# Patient Record
Sex: MALE | Race: OTHER | NOT HISPANIC OR LATINO
[De-identification: names, ages, dates, MRNs, and addresses within clinical notes are randomized per-mention and may not be internally consistent; named-entity substitution may affect disease eponyms.]

---

## 2017-03-16 PROBLEM — Z00.00 ENCOUNTER FOR PREVENTIVE HEALTH EXAMINATION: Status: ACTIVE | Noted: 2017-03-16

## 2017-12-01 ENCOUNTER — APPOINTMENT (OUTPATIENT)
Dept: PULMONOLOGY | Facility: CLINIC | Age: 67
End: 2017-12-01
Payer: MEDICARE

## 2017-12-01 VITALS
TEMPERATURE: 98.2 F | DIASTOLIC BLOOD PRESSURE: 73 MMHG | SYSTOLIC BLOOD PRESSURE: 130 MMHG | HEART RATE: 83 BPM | HEIGHT: 66 IN | OXYGEN SATURATION: 97 % | BODY MASS INDEX: 28.79 KG/M2 | WEIGHT: 179.13 LBS

## 2017-12-01 DIAGNOSIS — Z83.3 FAMILY HISTORY OF DIABETES MELLITUS: ICD-10-CM

## 2017-12-01 DIAGNOSIS — E78.9 DISORDER OF LIPOPROTEIN METABOLISM, UNSPECIFIED: ICD-10-CM

## 2017-12-01 DIAGNOSIS — K21.9 GASTRO-ESOPHAGEAL REFLUX DISEASE W/OUT ESOPHAGITIS: ICD-10-CM

## 2017-12-01 PROCEDURE — 99214 OFFICE O/P EST MOD 30 MIN: CPT

## 2017-12-04 PROBLEM — E78.9 SERUM CHOLESTEROL ELEVATED: Status: RESOLVED | Noted: 2017-12-04 | Resolved: 2017-12-04

## 2017-12-04 PROBLEM — Z83.3 FAMILY HISTORY OF DIABETES MELLITUS: Status: ACTIVE | Noted: 2017-12-04

## 2017-12-04 PROBLEM — K21.9 CHRONIC GERD: Status: ACTIVE | Noted: 2017-12-04

## 2017-12-04 RX ORDER — SIMVASTATIN 40 MG/1
TABLET, FILM COATED ORAL
Refills: 0 | Status: ACTIVE | COMMUNITY

## 2017-12-04 RX ORDER — OMEPRAZOLE 20 MG/1
20 CAPSULE, DELAYED RELEASE ORAL
Refills: 0 | Status: ACTIVE | COMMUNITY

## 2017-12-04 RX ORDER — DICYCLOMINE HYDROCHLORIDE 10 MG/1
10 CAPSULE ORAL
Refills: 0 | Status: ACTIVE | COMMUNITY

## 2018-02-19 ENCOUNTER — MOBILE ON CALL (OUTPATIENT)
Age: 68
End: 2018-02-19

## 2018-02-21 ENCOUNTER — APPOINTMENT (OUTPATIENT)
Dept: PULMONOLOGY | Facility: CLINIC | Age: 68
End: 2018-02-21
Payer: MEDICARE

## 2018-02-21 VITALS
WEIGHT: 177 LBS | BODY MASS INDEX: 28.57 KG/M2 | SYSTOLIC BLOOD PRESSURE: 112 MMHG | HEART RATE: 80 BPM | DIASTOLIC BLOOD PRESSURE: 64 MMHG | OXYGEN SATURATION: 98 %

## 2018-02-21 PROCEDURE — 99213 OFFICE O/P EST LOW 20 MIN: CPT

## 2019-03-27 ENCOUNTER — OTHER (OUTPATIENT)
Age: 69
End: 2019-03-27

## 2021-04-05 ENCOUNTER — APPOINTMENT (OUTPATIENT)
Dept: PULMONOLOGY | Facility: CLINIC | Age: 71
End: 2021-04-05
Payer: MEDICARE

## 2021-04-05 VITALS
DIASTOLIC BLOOD PRESSURE: 87 MMHG | HEIGHT: 66 IN | TEMPERATURE: 97.6 F | SYSTOLIC BLOOD PRESSURE: 137 MMHG | HEART RATE: 84 BPM | WEIGHT: 165 LBS | OXYGEN SATURATION: 97 % | BODY MASS INDEX: 26.52 KG/M2

## 2021-04-05 DIAGNOSIS — G47.33 OBSTRUCTIVE SLEEP APNEA (ADULT) (PEDIATRIC): ICD-10-CM

## 2021-04-05 PROCEDURE — 99214 OFFICE O/P EST MOD 30 MIN: CPT

## 2021-04-05 NOTE — PHYSICAL EXAM
[General Appearance - Well Developed] : well developed [Normal Appearance] : normal appearance [Well Groomed] : well groomed [General Appearance - Well Nourished] : well nourished [No Deformities] : no deformities [General Appearance - In No Acute Distress] : no acute distress [Normal Oropharynx] : normal oropharynx [Neck Appearance] : the appearance of the neck was normal [Neck Cervical Mass (___cm)] : no neck mass was observed [Jugular Venous Distention Increased] : there was no jugular-venous distention [Thyroid Diffuse Enlargement] : the thyroid was not enlarged [Thyroid Nodule] : there were no palpable thyroid nodules [Heart Rate And Rhythm] : heart rate was normal and rhythm regular [Heart Sounds] : normal S1 and S2 [Heart Sounds Gallop] : no gallops [Murmurs] : no murmurs [Heart Sounds Pericardial Friction Rub] : no pericardial rub [] : no respiratory distress [Auscultation Breath Sounds / Voice Sounds] : lungs were clear to auscultation bilaterally [Involuntary Movements] : no involuntary movements were seen [Skin Color & Pigmentation] : normal skin color and pigmentation [Skin Turgor] : normal skin turgor [No Focal Deficits] : no focal deficits [Oriented To Time, Place, And Person] : oriented to person, place, and time [Impaired Insight] : insight and judgment were intact [Affect] : the affect was normal

## 2021-04-07 ENCOUNTER — FORM ENCOUNTER (OUTPATIENT)
Age: 71
End: 2021-04-07

## 2021-04-07 NOTE — HISTORY OF PRESENT ILLNESS
[FreeTextEntry1] : 04/05/2021 :  GEORGE MENKES is a 70 year male with PMHx BELGICA on CPAP who is here for follow up. \par \par His download from today shows: average use of 7 hours and 31 min, with mediana pressure 9.6 and maximum of 11.8 cmH2O, and AHI 2.9. \par He feels great with CPAP. Since he is part of medicare now he will no longer will be covered by [employer] and need a new prescription for community surgical. Otherwise, has been getting his supplies and denies any new complains or diagnosis since the last visit\par

## 2021-05-07 ENCOUNTER — OUTPATIENT (OUTPATIENT)
Dept: OUTPATIENT SERVICES | Facility: HOSPITAL | Age: 71
LOS: 1 days | End: 2021-05-07
Payer: MEDICARE

## 2021-05-07 ENCOUNTER — APPOINTMENT (OUTPATIENT)
Dept: SLEEP CENTER | Facility: HOME HEALTH | Age: 71
End: 2021-05-07
Payer: MEDICARE

## 2021-05-07 PROCEDURE — G0400: CPT | Mod: 26

## 2021-05-07 PROCEDURE — 95800 SLP STDY UNATTENDED: CPT

## 2021-05-19 DIAGNOSIS — G47.33 OBSTRUCTIVE SLEEP APNEA (ADULT) (PEDIATRIC): ICD-10-CM

## 2021-07-01 ENCOUNTER — TRANSCRIPTION ENCOUNTER (OUTPATIENT)
Age: 71
End: 2021-07-01

## 2021-10-30 ENCOUNTER — TRANSCRIPTION ENCOUNTER (OUTPATIENT)
Age: 71
End: 2021-10-30

## 2021-11-24 ENCOUNTER — TRANSCRIPTION ENCOUNTER (OUTPATIENT)
Age: 71
End: 2021-11-24

## 2021-12-06 ENCOUNTER — APPOINTMENT (OUTPATIENT)
Dept: PULMONOLOGY | Facility: CLINIC | Age: 71
End: 2021-12-06

## 2021-12-06 ENCOUNTER — APPOINTMENT (OUTPATIENT)
Dept: PULMONOLOGY | Facility: CLINIC | Age: 71
End: 2021-12-06
Payer: MEDICARE

## 2021-12-06 VITALS
SYSTOLIC BLOOD PRESSURE: 161 MMHG | WEIGHT: 164 LBS | TEMPERATURE: 97.1 F | OXYGEN SATURATION: 96 % | DIASTOLIC BLOOD PRESSURE: 76 MMHG | HEART RATE: 91 BPM | HEIGHT: 66 IN | BODY MASS INDEX: 26.36 KG/M2

## 2021-12-06 DIAGNOSIS — G47.33 OBSTRUCTIVE SLEEP APNEA (ADULT) (PEDIATRIC): ICD-10-CM

## 2021-12-06 DIAGNOSIS — Z99.89 OBSTRUCTIVE SLEEP APNEA (ADULT) (PEDIATRIC): ICD-10-CM

## 2021-12-06 PROCEDURE — 99213 OFFICE O/P EST LOW 20 MIN: CPT

## 2021-12-06 NOTE — ASSESSMENT
[FreeTextEntry1] : current CPAP is Dreamstation 2, download looks good, auto, Apnea Hypopnea Index 3.2. Doing very well with CPAP, excellent compliance and efficacy.  Benefiting from usage.\par \par Given long term advice about CPAP use.  Call durable medical equipment provider if any equipment problems.  Replace interface as per schedule, generally at least every 3 months.  Stressed importance of CPAP compliance.  Return to see me in about 12 months if doing well.

## 2021-12-06 NOTE — PHYSICAL EXAM
[General Appearance - Well Developed] : well developed [Normal Appearance] : normal appearance [Well Groomed] : well groomed [General Appearance - Well Nourished] : well nourished [No Deformities] : no deformities [General Appearance - In No Acute Distress] : no acute distress [Normal Oropharynx] : normal oropharynx [III] : III [] : no respiratory distress [Auscultation Breath Sounds / Voice Sounds] : lungs were clear to auscultation bilaterally [Abnormal Walk] : normal gait [Nail Clubbing] : no clubbing  or cyanosis of the fingernails [Musculoskeletal - Swelling] : no joint swelling seen [Motor Tone] : muscle strength and tone were normal

## 2021-12-06 NOTE — HISTORY OF PRESENT ILLNESS
[FreeTextEntry1] : 12/1/17:\par \par Followup visit for this 67-year-old gentleman whom I last saw at [place] about 2 years ago. He's been on CPAP for several years at least. He is currently using AutoSet CPAP set between 8 and 12 cm of water pressure with a standard nasal mask. He goes to bed about midnight, sleep latency 5 minutes, no weights before getting up at 7:20 AM. He uses his machine every night. He does not nap and he does not complain of any daytime sleepiness, [place] sleepiness score of zero. There is no history of parasomnia or any leg movements, restless legs, sleep paralysis, morning headache.\par \par 2/21/18:\par Received new CPAP machine December, very comfortable, 100% compliance.  Benefitting from use\par \par 04/05/2021 : GEORGE MENKES is a 70 year male with PMHx BELGICA on CPAP who is here for follow up. \par \par His download from today shows: average use of 7 hours and 31 min, with mediana pressure 9.6 and maximum of 11.8 cmH2O, and AHI 2.9. \par \par He feels great with CPAP. Since he is part of medicare now he will no longer will be covered by [employer] and need a new prescription for community surgical. Otherwise, has been getting his supplies and denies any new complains or diagnosis since the last visit\par \par \par 12/6/21:  Had Respironics CPAP machine recalled, now replaced.  Used older Resmed machine in interim.  Both show download working well, Apnea Hypopnea Index in 3's, good mask fit.  100% use.  Does not use humidifier.  Uses 3rd party .   Interval medical hx- told he has prediabetes, HbA1C high 6s. On metformin.  Has lost weight since 2018.

## 2022-11-07 ENCOUNTER — NON-APPOINTMENT (OUTPATIENT)
Age: 72
End: 2022-11-07